# Patient Record
Sex: FEMALE | Race: WHITE
[De-identification: names, ages, dates, MRNs, and addresses within clinical notes are randomized per-mention and may not be internally consistent; named-entity substitution may affect disease eponyms.]

---

## 2019-08-14 PROBLEM — Z00.00 ENCOUNTER FOR PREVENTIVE HEALTH EXAMINATION: Status: ACTIVE | Noted: 2019-08-14

## 2020-08-26 ENCOUNTER — RECORD ABSTRACTING (OUTPATIENT)
Age: 65
End: 2020-08-26

## 2020-08-26 DIAGNOSIS — H90.5 UNSPECIFIED SENSORINEURAL HEARING LOSS: ICD-10-CM

## 2020-08-26 DIAGNOSIS — Z78.9 OTHER SPECIFIED HEALTH STATUS: ICD-10-CM

## 2020-08-26 DIAGNOSIS — E04.2 NONTOXIC MULTINODULAR GOITER: ICD-10-CM

## 2020-08-26 DIAGNOSIS — H83.03 LABYRINTHITIS, BILATERAL: ICD-10-CM

## 2020-08-26 DIAGNOSIS — Z87.898 PERSONAL HISTORY OF OTHER SPECIFIED CONDITIONS: ICD-10-CM

## 2020-08-26 DIAGNOSIS — R42 DIZZINESS AND GIDDINESS: ICD-10-CM

## 2020-08-26 DIAGNOSIS — I10 ESSENTIAL (PRIMARY) HYPERTENSION: ICD-10-CM

## 2020-08-26 DIAGNOSIS — J34.2 DEVIATED NASAL SEPTUM: ICD-10-CM

## 2020-08-26 DIAGNOSIS — Z88.9 ALLERGY STATUS TO UNSPECIFIED DRUGS, MEDICAMENTS AND BIOLOGICAL SUBSTANCES: ICD-10-CM

## 2020-08-26 DIAGNOSIS — Z87.09 PERSONAL HISTORY OF OTHER DISEASES OF THE RESPIRATORY SYSTEM: ICD-10-CM

## 2020-08-26 RX ORDER — AMLODIPINE BESYLATE 10 MG/1
10 TABLET ORAL
Refills: 0 | Status: ACTIVE | COMMUNITY

## 2020-08-26 RX ORDER — PITAVASTATIN CALCIUM 2.09 MG/1
2 TABLET, FILM COATED ORAL
Refills: 0 | Status: ACTIVE | COMMUNITY

## 2020-08-31 ENCOUNTER — APPOINTMENT (OUTPATIENT)
Dept: OTOLARYNGOLOGY | Facility: CLINIC | Age: 65
End: 2020-08-31
Payer: COMMERCIAL

## 2020-08-31 VITALS
BODY MASS INDEX: 22.88 KG/M2 | DIASTOLIC BLOOD PRESSURE: 69 MMHG | TEMPERATURE: 94.6 F | WEIGHT: 134 LBS | HEART RATE: 56 BPM | SYSTOLIC BLOOD PRESSURE: 139 MMHG | HEIGHT: 64 IN

## 2020-08-31 DIAGNOSIS — J32.0 CHRONIC MAXILLARY SINUSITIS: ICD-10-CM

## 2020-08-31 DIAGNOSIS — J32.3 CHRONIC SPHENOIDAL SINUSITIS: ICD-10-CM

## 2020-08-31 DIAGNOSIS — J32.1 CHRONIC FRONTAL SINUSITIS: ICD-10-CM

## 2020-08-31 DIAGNOSIS — J32.2 CHRONIC ETHMOIDAL SINUSITIS: ICD-10-CM

## 2020-08-31 PROCEDURE — 31237 NSL/SINS NDSC SURG BX POLYPC: CPT | Mod: 50

## 2020-08-31 PROCEDURE — 99024 POSTOP FOLLOW-UP VISIT: CPT

## 2020-08-31 RX ORDER — FAMOTIDINE 40 MG/1
40 TABLET, FILM COATED ORAL
Refills: 0 | Status: DISCONTINUED | COMMUNITY
End: 2020-08-31

## 2020-08-31 RX ORDER — FLUOXETINE HYDROCHLORIDE 20 MG/1
20 CAPSULE ORAL
Refills: 0 | Status: DISCONTINUED | COMMUNITY
End: 2020-08-31

## 2020-08-31 RX ORDER — ALPRAZOLAM 0.5 MG/1
0.5 TABLET ORAL
Refills: 0 | Status: DISCONTINUED | COMMUNITY
End: 2020-08-31

## 2020-08-31 RX ORDER — FLUTICASONE FUROATE 27.5 UG/1
27.5 SPRAY, METERED NASAL
Refills: 0 | Status: DISCONTINUED | COMMUNITY
End: 2020-08-31

## 2020-08-31 NOTE — ADDENDUM
[FreeTextEntry1] : Documented by Laura Mendoza acting as scribe for Dr. Breen on 08/31/2020.\par \par All Medical record entries made by the Scribe were at my, Dr. Breen, direction and personally dictated by me on 08/31/2020 . I have reviewed the chart and agree that the record accurately reflects my personal performance of the history, physical exam, assessment and plan. I have also personally directed, reviewed, and agreed with the discharge instructions.

## 2020-08-31 NOTE — PHYSICAL EXAM
[Midline] : trachea is located in midline position [Clear / Open well] : hypopharynx is clear and opens well [Normal] : no rashes [FreeTextEntry1] : No PND or drainage. [FreeTextEntry2] : sinuses nontender

## 2020-08-31 NOTE — ASSESSMENT
[FreeTextEntry1] : s/p Bilateral Frontal with Balloon Dilations, Bilateral Sphenoid with Balloon Dilations, Left Maxillary Antrostomy with Balloon Dilations, and Left Total Ethmoids with Shaver on 8/20/2020 - healing well, debrided on the left\par \par Plan:\par Rigid Nasal Endoscopy w/ Left Sinus Debridement. Sinus rinse. FU 6-8 weeks. Recommended FU with Dr. Raymond Reyes in [place].

## 2020-08-31 NOTE — PROCEDURE
[FreeTextEntry6] : Procedure:  Rigid Nasal Endoscopy w/ Left Sinus Debridement: Risks, benefits, and alternatives of rigid endoscopy were explained to the patient.  Specific mention was made of the risk of throat numbness.  The patient gave oral consent to proceed.  The nasal cavities were decongested and anesthetized with a combination of oxymetazoline and 4% lidocaine solution.  The 0 degree scope was inserted into the left nasal cavity.  Endoscopy of the inferior and middle meatus was performed. Spheno-ethmoid recess clear.  Nasopharynx was clear.  Turbinates were without mass. The procedure was repeated on the contralateral side with similar findings. Middle meatus and sinus debridement done on the left. The patient tolerated the procedure well.

## 2020-08-31 NOTE — HISTORY OF PRESENT ILLNESS
[de-identified] : Pt is s/p Bilateral Frontal with Balloon Dilations, Bilateral Sphenoid with Balloon Dilations, Left Maxillary Antrostomy with Balloon Dilations, and Left Total Ethmoids with Shaver on 8/20/2020. Pt reports doing well.

## 2020-08-31 NOTE — CONSULT LETTER
[Dear  ___] : Dear  [unfilled], [Consult Letter:] : I had the pleasure of evaluating your patient, [unfilled]. [Please see my note below.] : Please see my note below. [Consult Closing:] : Thank you very much for allowing me to participate in the care of this patient.  If you have any questions, please do not hesitate to contact me. [Sincerely,] : Sincerely, [FreeTextEntry3] : Dr. Ji Breen